# Patient Record
Sex: MALE | Race: AMERICAN INDIAN OR ALASKA NATIVE
[De-identification: names, ages, dates, MRNs, and addresses within clinical notes are randomized per-mention and may not be internally consistent; named-entity substitution may affect disease eponyms.]

---

## 2019-08-07 ENCOUNTER — HOSPITAL ENCOUNTER (EMERGENCY)
Dept: HOSPITAL 5 - ED | Age: 59
Discharge: HOME | End: 2019-08-07
Payer: MEDICARE

## 2019-08-07 VITALS — SYSTOLIC BLOOD PRESSURE: 156 MMHG | DIASTOLIC BLOOD PRESSURE: 108 MMHG

## 2019-08-07 DIAGNOSIS — K52.9: Primary | ICD-10-CM

## 2019-08-07 DIAGNOSIS — F17.200: ICD-10-CM

## 2019-08-07 DIAGNOSIS — Z79.899: ICD-10-CM

## 2019-08-07 LAB
ALBUMIN SERPL-MCNC: 4.4 G/DL (ref 3.9–5)
ALT SERPL-CCNC: 20 UNITS/L (ref 7–56)
BACTERIA #/AREA URNS HPF: (no result) /HPF
BASOPHILS # (AUTO): 0.1 K/MM3 (ref 0–0.1)
BASOPHILS NFR BLD AUTO: 0.5 % (ref 0–1.8)
BILIRUB DIRECT SERPL-MCNC: 0.2 MG/DL (ref 0–0.2)
BILIRUB UR QL STRIP: (no result)
BLOOD UR QL VISUAL: (no result)
BUN SERPL-MCNC: 11 MG/DL (ref 9–20)
BUN/CREAT SERPL: 14 %
CALCIUM SERPL-MCNC: 10.1 MG/DL (ref 8.4–10.2)
CAOX CRY #/AREA URNS HPF: (no result) /[HPF]
EOSINOPHIL # BLD AUTO: 0 K/MM3 (ref 0–0.4)
EOSINOPHIL NFR BLD AUTO: 0.2 % (ref 0–4.3)
HCT VFR BLD CALC: 45.3 % (ref 35.5–45.6)
HEMOLYSIS INDEX: 4
HGB BLD-MCNC: 15.6 GM/DL (ref 11.8–15.2)
LYMPHOCYTES # BLD AUTO: 3 K/MM3 (ref 1.2–5.4)
LYMPHOCYTES NFR BLD AUTO: 16.3 % (ref 13.4–35)
MCHC RBC AUTO-ENTMCNC: 34 % (ref 32–34)
MCV RBC AUTO: 83 FL (ref 84–94)
MONOCYTES # (AUTO): 1.3 K/MM3 (ref 0–0.8)
MONOCYTES % (AUTO): 7.3 % (ref 0–7.3)
MUCOUS THREADS #/AREA URNS HPF: (no result) /HPF
PH UR STRIP: 5 [PH] (ref 5–7)
PLATELET # BLD: 284 K/MM3 (ref 140–440)
RBC # BLD AUTO: 5.46 M/MM3 (ref 3.65–5.03)
RBC #/AREA URNS HPF: 3 /HPF (ref 0–6)
SPERM #/AREA URNS HPF: (no result) /HPF
UROBILINOGEN UR-MCNC: 2 MG/DL (ref ?–2)
WBC #/AREA URNS HPF: 5 /HPF (ref 0–6)

## 2019-08-07 PROCEDURE — 96361 HYDRATE IV INFUSION ADD-ON: CPT

## 2019-08-07 PROCEDURE — 80048 BASIC METABOLIC PNL TOTAL CA: CPT

## 2019-08-07 PROCEDURE — 96365 THER/PROPH/DIAG IV INF INIT: CPT

## 2019-08-07 PROCEDURE — 74177 CT ABD & PELVIS W/CONTRAST: CPT

## 2019-08-07 PROCEDURE — 36415 COLL VENOUS BLD VENIPUNCTURE: CPT

## 2019-08-07 PROCEDURE — 82140 ASSAY OF AMMONIA: CPT

## 2019-08-07 PROCEDURE — 83690 ASSAY OF LIPASE: CPT

## 2019-08-07 PROCEDURE — 81001 URINALYSIS AUTO W/SCOPE: CPT

## 2019-08-07 PROCEDURE — 80076 HEPATIC FUNCTION PANEL: CPT

## 2019-08-07 PROCEDURE — 85025 COMPLETE CBC W/AUTO DIFF WBC: CPT

## 2019-08-07 PROCEDURE — 96375 TX/PRO/DX INJ NEW DRUG ADDON: CPT

## 2019-08-07 PROCEDURE — 87040 BLOOD CULTURE FOR BACTERIA: CPT

## 2019-08-07 PROCEDURE — 99284 EMERGENCY DEPT VISIT MOD MDM: CPT

## 2019-08-07 NOTE — EMERGENCY DEPARTMENT REPORT
HPI





- General


Chief Complaint: Nausea/Vomiting/Diarrhea


Time Seen by Provider: 08/07/19 10:27





ED Past Medical Hx





- Past Medical History


Previous Medical History?: No





- Surgical History


Past Surgical History?: Yes


Additional Surgical History: "reconstructive surgery."





- Social History


Smoking Status: Current Every Day Smoker


Substance Use Type: Alcohol





- Medications


Home Medications: 


                                Home Medications











 Medication  Instructions  Recorded  Confirmed  Last Taken  Type


 


Ondansetron [Zofran Odt] 4 mg PO Q8HR PRN #10 tab.rapdis 08/07/19  Unknown Rx














ED Review of Systems


ROS: 


Stated complaint: CANNOT EAT OR DRINK


Other details as noted in HPI





Comment: All other systems reviewed and negative





Physical Exam





- Physical Exam


Vital Signs: 


                                   Vital Signs











  08/07/19





  09:16


 


Temperature 98.6 F


 


Pulse Rate 84


 


Respiratory 18





Rate 


 


Blood Pressure 147/94


 


O2 Sat by Pulse 98





Oximetry 














ED Course


                                   Vital Signs











  08/07/19





  09:16


 


Temperature 98.6 F


 


Pulse Rate 84


 


Respiratory 18





Rate 


 


Blood Pressure 147/94


 


O2 Sat by Pulse 98





Oximetry 














- Reevaluation(s)


Reevaluation #1: 





08/07/19 10:20


pt at registration; not yet bedded





ED Medical Decision Making





- Lab Data


Result diagrams: 


                                 08/07/19 09:53





                                 08/07/19 09:53





- Radiology Data


Radiology results: report reviewed, image reviewed





- Medical Decision Making





                                   Lab Results











  08/07/19 08/07/19 08/07/19 Range/Units





  09:53 09:53 09:53 


 


WBC  18.1 H    (4.5-11.0)  K/mm3


 


RBC  5.46 H    (3.65-5.03)  M/mm3


 


Hgb  15.6 H    (11.8-15.2)  gm/dl


 


Hct  45.3    (35.5-45.6)  %


 


MCV  83 L    (84-94)  fl


 


MCH  29    (28-32)  pg


 


MCHC  34    (32-34)  %


 


RDW  14.3    (13.2-15.2)  %


 


Plt Count  284    (140-440)  K/mm3


 


Lymph % (Auto)  16.3    (13.4-35.0)  %


 


Mono % (Auto)  7.3    (0.0-7.3)  %


 


Eos % (Auto)  0.2    (0.0-4.3)  %


 


Baso % (Auto)  0.5    (0.0-1.8)  %


 


Lymph #  3.0    (1.2-5.4)  K/mm3


 


Mono #  1.3 H    (0.0-0.8)  K/mm3


 


Eos #  0.0    (0.0-0.4)  K/mm3


 


Baso #  0.1    (0.0-0.1)  K/mm3


 


Seg Neutrophils %  75.7 H    (40.0-70.0)  %


 


Seg Neutrophils #  13.7 H    (1.8-7.7)  K/mm3


 


Sodium   141   (137-145)  mmol/L


 


Potassium   3.4 L   (3.6-5.0)  mmol/L


 


Chloride   101.1   ()  mmol/L


 


Carbon Dioxide   25   (22-30)  mmol/L


 


Anion Gap   18   mmol/L


 


BUN   11   (9-20)  mg/dL


 


Creatinine   0.8   (0.8-1.5)  mg/dL


 


Estimated GFR   > 60   ml/min


 


BUN/Creatinine Ratio   14   %


 


Glucose   122 H   ()  mg/dL


 


Lactic Acid     (0.7-2.0)  mmol/L


 


Calcium   10.1   (8.4-10.2)  mg/dL


 


Total Bilirubin     (0.1-1.2)  mg/dL


 


Direct Bilirubin     (0-0.2)  mg/dL


 


Indirect Bilirubin     mg/dL


 


AST     (5-40)  units/L


 


ALT     (7-56)  units/L


 


Alkaline Phosphatase     ()  units/L


 


Total Protein     (6.3-8.2)  g/dL


 


Albumin     (3.9-5)  g/dL


 


Albumin/Globulin Ratio     %


 


Lipase    47  (13-60)  units/L


 


Urine Color     (Yellow)  


 


Urine Turbidity     (Clear)  


 


Urine pH     (5.0-7.0)  


 


Ur Specific Gravity     (1.003-1.030)  


 


Urine Protein     (Negative)  mg/dL


 


Urine Glucose (UA)     (Negative)  mg/dL


 


Urine Ketones     (Negative)  mg/dL


 


Urine Blood     (Negative)  


 


Urine Nitrite     (Negative)  


 


Urine Bilirubin     (Negative)  


 


Urine Urobilinogen     (<2.0)  mg/dL


 


Ur Leukocyte Esterase     (Negative)  


 


Urine WBC (Auto)     (0.0-6.0)  /HPF


 


Urine RBC (Auto)     (0.0-6.0)  /HPF


 


U Epithel Cells (Auto)     (0-13.0)  /HPF


 


Urine Bacteria (Auto)     (Negative)  /HPF


 


Calcium Oxalate Crystal     


 


Urine Mucus     /HPF


 


Urine Sperm     (NP)  /HPF














  08/07/19 08/07/19 08/07/19 Range/Units





  09:58 10:40 11:49 


 


WBC     (4.5-11.0)  K/mm3


 


RBC     (3.65-5.03)  M/mm3


 


Hgb     (11.8-15.2)  gm/dl


 


Hct     (35.5-45.6)  %


 


MCV     (84-94)  fl


 


MCH     (28-32)  pg


 


MCHC     (32-34)  %


 


RDW     (13.2-15.2)  %


 


Plt Count     (140-440)  K/mm3


 


Lymph % (Auto)     (13.4-35.0)  %


 


Mono % (Auto)     (0.0-7.3)  %


 


Eos % (Auto)     (0.0-4.3)  %


 


Baso % (Auto)     (0.0-1.8)  %


 


Lymph #     (1.2-5.4)  K/mm3


 


Mono #     (0.0-0.8)  K/mm3


 


Eos #     (0.0-0.4)  K/mm3


 


Baso #     (0.0-0.1)  K/mm3


 


Seg Neutrophils %     (40.0-70.0)  %


 


Seg Neutrophils #     (1.8-7.7)  K/mm3


 


Sodium     (137-145)  mmol/L


 


Potassium     (3.6-5.0)  mmol/L


 


Chloride     ()  mmol/L


 


Carbon Dioxide     (22-30)  mmol/L


 


Anion Gap     mmol/L


 


BUN     (9-20)  mg/dL


 


Creatinine     (0.8-1.5)  mg/dL


 


Estimated GFR     ml/min


 


BUN/Creatinine Ratio     %


 


Glucose     ()  mg/dL


 


Lactic Acid   1.00   (0.7-2.0)  mmol/L


 


Calcium     (8.4-10.2)  mg/dL


 


Total Bilirubin  1.10    (0.1-1.2)  mg/dL


 


Direct Bilirubin  0.2    (0-0.2)  mg/dL


 


Indirect Bilirubin  0.9    mg/dL


 


AST  18    (5-40)  units/L


 


ALT  20    (7-56)  units/L


 


Alkaline Phosphatase  98    ()  units/L


 


Total Protein  7.8    (6.3-8.2)  g/dL


 


Albumin  4.4    (3.9-5)  g/dL


 


Albumin/Globulin Ratio  1.3    %


 


Lipase     (13-60)  units/L


 


Urine Color    Denisse  (Yellow)  


 


Urine Turbidity    Slightly-cloudy  (Clear)  


 


Urine pH    5.0  (5.0-7.0)  


 


Ur Specific Gravity    1.030  (1.003-1.030)  


 


Urine Protein    30 mg/dl  (Negative)  mg/dL


 


Urine Glucose (UA)    Neg  (Negative)  mg/dL


 


Urine Ketones    Tr  (Negative)  mg/dL


 


Urine Blood    Neg  (Negative)  


 


Urine Nitrite    Neg  (Negative)  


 


Urine Bilirubin    Neg  (Negative)  


 


Urine Urobilinogen    2.0  (<2.0)  mg/dL


 


Ur Leukocyte Esterase    Neg  (Negative)  


 


Urine WBC (Auto)    5.0  (0.0-6.0)  /HPF


 


Urine RBC (Auto)    3.0  (0.0-6.0)  /HPF


 


U Epithel Cells (Auto)    < 1.0  (0-13.0)  /HPF


 


Urine Bacteria (Auto)    2+  (Negative)  /HPF


 


Calcium Oxalate Crystal    1+  


 


Urine Mucus    3+  /HPF


 


Urine Sperm    1+  (NP)  /HPF








                                   Vital Signs











  08/07/19





  09:16


 


Temperature 98.6 F


 


Pulse Rate 84


 


Respiratory 18





Rate 


 


Blood Pressure 147/94


 


O2 Sat by Pulse 98





Oximetry 











labs noted





medicated with ns/flagyl/zofran





taking PO





CT noted





will dc home with dc plan of care and pcp follow up later in the week. 








Critical care attestation.: 


If time is entered above; I have spent that time in minutes in the direct care 

of this critically ill patient, excluding procedure time.








ED Disposition


Clinical Impression: 


 Gastroenteritis





Disposition: DC-01 TO HOME OR SELFCARE


Is pt being admited?: No


Does the pt Need Aspirin: No


Condition: Stable


Instructions:  Acute Nausea and Vomiting (ED), Gastroenteritis (ED)


Additional Instructions: 


BLAND DIET TODAY


ADVANCE AS TOLERATED





HYDRATE WELL WITH WATER





MEDS AS ORDERED TODAY





FOLLOW UP WITH PCP IN 48 HOURS


REFERRAL PROVIDED BELOW








Referrals: 


GUSTAVO FLANAGAN MD [Staff Physician] - 3-5 Days


Time of Disposition: 13:32

## 2019-08-07 NOTE — CAT SCAN REPORT
CT ABDOMEN AND PELVIS WITH CONTRAST



HISTORY: MAIN: ABD PAIN WBC 18 TECH NOTE: COLD SWEATS AND ABDOMEN PAIN . Leukocytosis.



COMPARISON: None.



TECHNIQUE: Axial CT images were obtained through the abdomen and pelvis after 100 cc of Omnipaque 300
 intravenously. Sagittal and coronal reformatted images. All CT scans at this location are performed 
using CT dose reduction for ALARA by means of automated exposure control.





FINDINGS:



CT ABDOMEN:

Lung Bases: Clear.

Liver: No significant abnormality.

Biliary: No significant abnormality.

Spleen: No significant abnormality.  Unenlarged.

Pancreas: No significant abnormality.

Adrenals: No significant abnormality.

Kidneys: No significant abnormality.

Lymphatics: No lymphadenopathy.

Vasculature: No significant abnormality. 

Bowel/Peritoneum: No significant abnormality. No free air. No free fluid. The appendix is not confide
ntly identified.



CT PELVIS:

: No significant abnormality.



Osseous Structures: Severe degenerative changes at L3-4, L4-5 and L5-S1.

Additional Findings: None



IMPRESSION:

No acute process is identified in the abdomen or pelvis.

Degenerative changes in the lumbar spine.



Signer Name: Claudy Angel Jr, MD 

Signed: 8/7/2019 1:24 PM

 Workstation Name: [id number]